# Patient Record
Sex: MALE | Race: WHITE
[De-identification: names, ages, dates, MRNs, and addresses within clinical notes are randomized per-mention and may not be internally consistent; named-entity substitution may affect disease eponyms.]

---

## 2021-12-28 ENCOUNTER — HOSPITAL ENCOUNTER (EMERGENCY)
Dept: HOSPITAL 61 - ER | Age: 41
End: 2021-12-28
Payer: SELF-PAY

## 2021-12-28 DIAGNOSIS — J18.9: ICD-10-CM

## 2021-12-28 DIAGNOSIS — E11.22: ICD-10-CM

## 2021-12-28 DIAGNOSIS — I46.9: Primary | ICD-10-CM

## 2021-12-28 DIAGNOSIS — N18.9: ICD-10-CM

## 2021-12-28 DIAGNOSIS — E11.10: ICD-10-CM

## 2021-12-28 DIAGNOSIS — K75.9: ICD-10-CM

## 2021-12-28 DIAGNOSIS — A41.9: ICD-10-CM

## 2021-12-28 LAB
% ATYL: 1 % (ref 0–0)
% BANDS: 29 % (ref 0–9)
% LYMPHS: 37 % (ref 24–48)
% METAS: 5 % (ref 0–0)
% MONOS: 11 % (ref 0–10)
% MYELOS: 3 % (ref 0–0)
% SEGS: 14 % (ref 35–66)
ACETAMIN: < 2 MCG/ML (ref 10–30)
ALBUMIN SERPL-MCNC: 2.2 G/DL (ref 3.4–5)
ALP SERPL-CCNC: 156 U/L (ref 46–116)
ALT SERPL-CCNC: 523 U/L (ref 16–63)
ANION GAP SERPL CALC-SCNC: 37 MMOL/L (ref 6–14)
APTT BLD: 78 SEC (ref 24–38)
AST SERPL-CCNC: 1113 U/L (ref 15–37)
BASOPHILS # BLD AUTO: 0.1 X10^3/UL (ref 0–0.2)
BASOPHILS NFR BLD: 1 % (ref 0–3)
BILIRUB DIRECT SERPL-MCNC: 0.4 MG/DL (ref 0–0.2)
BILIRUB SERPL-MCNC: 0.6 MG/DL (ref 0.2–1)
BUN SERPL-MCNC: 35 MG/DL (ref 8–26)
CALCIUM SERPL-MCNC: 8.6 MG/DL (ref 8.5–10.1)
CHLORIDE SERPL-SCNC: 90 MMOL/L (ref 98–107)
CK SERPL-CCNC: 694 U/L (ref 39–308)
CO2 SERPL-SCNC: 10 MMOL/L (ref 21–32)
CREAT SERPL-MCNC: 2.5 MG/DL (ref 0.7–1.3)
EOSINOPHIL NFR BLD: 0 % (ref 0–3)
EOSINOPHIL NFR BLD: 0 X10^3/UL (ref 0–0.7)
ERYTHROCYTE [DISTWIDTH] IN BLOOD BY AUTOMATED COUNT: 14.3 % (ref 11.5–14.5)
GFR SERPLBLD BASED ON 1.73 SQ M-ARVRAT: 28.6 ML/MIN
GLUCOSE SERPL-MCNC: 740 MG/DL (ref 70–99)
HCT VFR BLD CALC: 49.8 % (ref 39–53)
HGB BLD-MCNC: 15.1 G/DL (ref 13–17.5)
LYMPHOCYTES # BLD: 3 X10^3/UL (ref 1–4.8)
LYMPHOCYTES NFR BLD AUTO: 33 % (ref 24–48)
MAGNESIUM SERPL-MCNC: 4.4 MG/DL (ref 1.8–2.4)
MCH RBC QN AUTO: 29 PG (ref 25–35)
MCHC RBC AUTO-ENTMCNC: 30 G/DL (ref 31–37)
MCV RBC AUTO: 94 FL (ref 79–100)
MONO #: 0.9 X10^3/UL (ref 0–1.1)
MONOCYTES NFR BLD: 9 % (ref 0–9)
NEUT #: 5.2 X10^3/UL (ref 1.8–7.7)
NEUTROPHILS NFR BLD AUTO: 57 % (ref 31–73)
PHOSPHATE SERPL-MCNC: 14.6 MG/DL (ref 2.6–4.7)
PLATELET # BLD AUTO: 156 X10^3/UL (ref 140–400)
PLATELET # BLD EST: ADEQUATE 10*3/UL
POTASSIUM SERPL-SCNC: 5.6 MMOL/L (ref 3.5–5.1)
PROT SERPL-MCNC: 6.4 G/DL (ref 6.4–8.2)
PROTHROMBIN TIME: 18.8 SEC (ref 11.7–14)
RBC # BLD AUTO: 5.3 X10^6/UL (ref 4.3–5.7)
SALIC: 4.5 MG/DL (ref 2.8–20)
SODIUM SERPL-SCNC: 137 MMOL/L (ref 136–145)
TOXIC GRANULES BLD QL SMEAR: PRESENT
WBC # BLD AUTO: 9.2 X10^3/UL (ref 4–11)

## 2021-12-28 PROCEDURE — 85730 THROMBOPLASTIN TIME PARTIAL: CPT

## 2021-12-28 PROCEDURE — 85025 COMPLETE CBC W/AUTO DIFF WBC: CPT

## 2021-12-28 PROCEDURE — 83735 ASSAY OF MAGNESIUM: CPT

## 2021-12-28 PROCEDURE — 94002 VENT MGMT INPAT INIT DAY: CPT

## 2021-12-28 PROCEDURE — 80076 HEPATIC FUNCTION PANEL: CPT

## 2021-12-28 PROCEDURE — 85007 BL SMEAR W/DIFF WBC COUNT: CPT

## 2021-12-28 PROCEDURE — 82010 KETONE BODYS QUAN: CPT

## 2021-12-28 PROCEDURE — 83605 ASSAY OF LACTIC ACID: CPT

## 2021-12-28 PROCEDURE — 82550 ASSAY OF CK (CPK): CPT

## 2021-12-28 PROCEDURE — 82140 ASSAY OF AMMONIA: CPT

## 2021-12-28 PROCEDURE — 71045 X-RAY EXAM CHEST 1 VIEW: CPT

## 2021-12-28 PROCEDURE — G0480 DRUG TEST DEF 1-7 CLASSES: HCPCS

## 2021-12-28 PROCEDURE — 83880 ASSAY OF NATRIURETIC PEPTIDE: CPT

## 2021-12-28 PROCEDURE — 85379 FIBRIN DEGRADATION QUANT: CPT

## 2021-12-28 PROCEDURE — 83690 ASSAY OF LIPASE: CPT

## 2021-12-28 PROCEDURE — 99291 CRITICAL CARE FIRST HOUR: CPT

## 2021-12-28 PROCEDURE — 80048 BASIC METABOLIC PNL TOTAL CA: CPT

## 2021-12-28 PROCEDURE — 80329 ANALGESICS NON-OPIOID 1 OR 2: CPT

## 2021-12-28 PROCEDURE — 85610 PROTHROMBIN TIME: CPT

## 2021-12-28 PROCEDURE — 84484 ASSAY OF TROPONIN QUANT: CPT

## 2021-12-28 PROCEDURE — 93005 ELECTROCARDIOGRAM TRACING: CPT

## 2021-12-28 PROCEDURE — 51702 INSERT TEMP BLADDER CATH: CPT

## 2021-12-28 PROCEDURE — 36415 COLL VENOUS BLD VENIPUNCTURE: CPT

## 2021-12-28 PROCEDURE — 84100 ASSAY OF PHOSPHORUS: CPT

## 2021-12-28 PROCEDURE — 92950 HEART/LUNG RESUSCITATION CPR: CPT

## 2021-12-28 NOTE — RAD
INDICATION: Reason: arrest / Spl. Instructions:  / History: 



COMPARISON: None.



FINDINGS:



Single view of chest obtained.

Endotracheal tube midthoracic trachea.

Enteric tube is seen coursing towards the diaphragm with a portion obscured secondary to numerous ove
rlying lines.

Bilateral pulmonic opacities.





IMPRESSION:



*  Bilateral pulmonic opacities which could be secondary to bilateral infiltrate or edema.



*  Lines and tubes as above.



Electronically signed by: Glen Kaba MD (12/28/2021 2:49 PM) [id number]

## 2021-12-28 NOTE — EKG
[facility]

              [address] [place] [address]

Test Date:    2021               Test Time:    14:34:19

Pat Name:     CESAR SEWELLN              Department:   

Patient ID:   [id number]           Room:          

Gender:       M                        Technician:   

:          1980               Requested By: LYNNE RIVAS

Order Number: [id number]           Reading MD:   Germán Marks

                                 Measurements

Intervals                              Axis          

Rate:         92                       P:            90

ID:           [id number]                      QRS:          116

QRSD:         116                      T:            131

QT:           372                                    

QTc:          465                                    

                           Interpretive Statements

SINUS RHYTHM

ABNORMAL RIGHT AXIS DEVIATION

LOW LIMB LEAD VOLTAGE

QRS(T) CONTOUR ABNORMALITY

CONSISTENT WITH HIGH LATERAL INFARCT

PROBABLY OLD

ABNORMAL ECG

RI6.02

No previous ECG available for comparison

Electronically Signed On 2021 7:56:24 CST by Germán Marks

## 2021-12-28 NOTE — PHYS DOC
General Adult


HPI:


HPI:


41-year-old male past medical history of non-insulin-dependent diabetes presents

to the ED brought by EMS in PEA cardiac arrest, found down and unresponsive by 

girlfriend, was last conscious approximately 20 minutes prior to him being found

down.  Patient lives with his girlfriend and 2 young children.  Patient had been

vomiting for the past 2 days, complained of a fever (was in bed/not wearing 

clothes with the window open) and had refused to come to the hospital earlier 

today.  Pt started complaining of shortness of breath today.  Reports symptoms 

of fever in all family members, girlfriend had a cough.  Patient and family are 

not vaccinated for Covid-gf suspected they all had covid.





Review of Systems:


Review of Systems:


ROS: unable due to medical condition





Heart Score:


C/O Chest Pain:  N/A


Risk Factors:


Risk Factors:  DM, Current or recent (<one month) smoker, HTN, HLP, family 

history of CAD, obesity.


Risk Scores:


Score 0 - 3:  2.5% MACE over next 6 weeks - Discharge Home


Score 4 - 6:  20.3% MACE over next 6 weeks - Admit for Clinical Observation


Score 7 - 10:  72.7% MACE over next 6 weeks - Early Invasive Strategies





Current Medications:





Current Medications








 Medications


  (Trade)  Dose


 Ordered  Sig/Richard  Start Time


 Stop Time Status Last Admin


Dose Admin


 


 Norepinephrine


 Bitartrate 8 mg/


 Dextrose  258 ml @ 


 19.35 mls/


 hr  1X  ONCE  21 14:45


 21 04:04   





 


 Piperacillin Sod/


 Tazobactam Sod


 4.5 gm/Dextrose  100 ml @ 


 200 mls/hr  1X  ONCE  21 15:00


 21 15:29   





 


 Sodium Chloride  1,000 ml @ 


 1,000 mls/hr  1X  ONCE  21 15:00


 21 15:59   





 


 Vancomycin HCl


  (Vanco Per


 Pharmacy)  1 each  PRN DAILY  PRN  21 14:45


     














Allergies:


Allergies:





Allergies








Coded Allergies Type Severity Reaction Last Updated Verified


 


  Unable to Assess    21 No











Physical Exam:


PE:





Constitutional: pea arrest, centrally warm, 500cc blood in OG suction canister 


HENT: Normocephalic, atraumatic,


Eyes: Unresponsive pupils, no discharge., no sceral icterus


Neck: No deformity or rigidity


Cardiovascular: pea, narrow rhythm


Lungs & Thorax: Bilateral breath sounds-decreased on right, I confirmed ett 

placement with mac 3 glidescope (8-0 ett, 26 at lip placed by ems)


Abdomen:  soft, no tenderness,  


Skin: Warm, dry, 


Extremities:  no cyanosis, no lower extremity edema


Neurologic: GCS3, unresponsive





Current Patient Data:


Labs:





                                Laboratory Tests








Test


 21


13:20


 


White Blood Count


 9.2 x10^3/uL


(4.0-11.0)


 


Red Blood Count


 5.30 x10^6/uL


(4.30-5.70)


 


Hemoglobin


 15.1 g/dL


(13.0-17.5)


 


Hematocrit


 49.8 %


(39.0-53.0)


 


Mean Corpuscular Volume


 94 fL ()





 


Mean Corpuscular Hemoglobin 29 pg (25-35)  


 


Mean Corpuscular Hemoglobin


Concent 30 g/dL


(31-37)  L


 


Red Cell Distribution Width


 14.3 %


(11.5-14.5)


 


Platelet Count


 156 x10^3/uL


(140-400)


 


Neutrophils (%) (Auto) 57 % (31-73)  


 


Lymphocytes (%) (Auto) 33 % (24-48)  


 


Monocytes (%) (Auto) 9 % (0-9)  


 


Eosinophils (%) (Auto) 0 % (0-3)  


 


Basophils (%) (Auto) 1 % (0-3)  


 


Neutrophils # (Auto)


 5.2 x10^3/uL


(1.8-7.7)


 


Lymphocytes # (Auto)


 3.0 x10^3/uL


(1.0-4.8)


 


Monocytes # (Auto)


 0.9 x10^3/uL


(0.0-1.1)


 


Eosinophils # (Auto)


 0.0 x10^3/uL


(0.0-0.7)


 


Basophils # (Auto)


 0.1 x10^3/uL


(0.0-0.2)


 


Prothrombin Time


 18.8 SEC


(11.7-14.0)  H


 


Prothrombin Time INR


 1.6 (0.8-1.1)


H


 


Activated Partial


Thromboplast Time 78 SEC (24-38)


H





                                Laboratory Tests


21 13:20








Vital Signs:





                                   Vital Signs








  Date Time  Temp Pulse Resp B/P (MAP) Pulse Ox O2 Delivery O2 Flow Rate FiO2


 


21 14:35     71 Ventilator  











EKG:


EKG:


sinus rhythm 92 bpm, rad, TWI 1 & aVL, no IHSAN/STD,





Radiology/Procedures:


Radiology/Procedures:


                                 IMAGING REPORT





                                     Signed





PATIENT: CESAR LANE    ACCOUNT: [id number]     MRN#: [id number]


: 1980           LOCATION: ER              AGE: 41


SEX: M                    EXAM DT: 21         ACCESSION#: [id number]


STATUS: REG ER            ORD. PHYSICIAN: ELOINA RIVAS DO


REASON: arrest


PROCEDURE: CHEST AP ONLY








INDICATION: Reason: arrest / Spl. Instructions:  / History: 





COMPARISON: None.





FINDINGS:





Single view of chest obtained.


Endotracheal tube midthoracic trachea.


Enteric tube is seen coursing towards the diaphragm with a portion obscured 

secondary to numerous overlying lines.


Bilateral pulmonic opacities.








IMPRESSION:





*  Bilateral pulmonic opacities which could be secondary to bilateral infiltrate

or edema.





*  Lines and tubes as above.





Electronically signed by: Bonnie Reynolds MD (2021 2:49 PM) [id number]














DICTATED and SIGNED BY:     BONNIE REYNOLDS MD


DATE:     21 [id number] 0





Course & Med Decision Making:


Course & Med Decision Making


Pertinent Labs and Imaging studies reviewed. (See chart for details)





Concern for PEA cardiac arrest, ATLS protocol continued in ed.  See nursing 

documentation for resuscitation efforts.  Patient was coded multiple times in 

the emergency department. Unable to obtain rosc in ed after 1hour and 45 minutes

of resuscitative efforts (from time of [id number]).  Labs concerning for DKA, 

sepsis, multifocal pneumonia, hepatitis and renal failure. Ddx also includes 

upper GI bleeding given 500 cc of blood suctioned from patient's OG tube during 

resuscitative efforts.  GF reports patient does not drink alcohol daily.  Next 

of kin( patient's sister) and medical examiner was called by RN. Girlfriend at 

bedside. Time of death 1527.





Critical Care: Authorized and Performed by: Eloina Rivas DO


Total critical care time: approximately 90 minutes





Due to a high probability of clinically significant, life threatening 

deterioration, the patient required my highest level of preparedness to 

intervene emergently and I personally spent this critical care time directly and

personally managing the patient. This critical care time included obtaining a h

istory; examining the patient; pulse oximetry; ventilator management if 

necessary; ordering and review of studies; arranging urgent treatment with 

development of a management plan; evaluation of patient's response to treatment;

frequent reassessment; discussion with patient/family; and, discussions with 

other providers. This critical care time was performed to assess and manage the 

high probability of imminent, life-threatening deterioration that could result 

in multi-organ failure. It was exclusive of separately billable procedures and 

treating other patients and teaching time.


Please see MDM section and the rest of the note for further information on 

patient assessment and treatment.





Dragon Disclaimer:


Dragon Disclaimer:


This electronic medical record was generated, in whole or in part, using a voice

recognition dictation system.





Departure


Departure


Impression:  


   Primary Impression:  


   Cardiac arrest


   Additional Impressions:  


   DKA (diabetic ketoacidosis)


   Sepsis


   Multifocal pneumonia


   Hepatitis


   Renal failure


Disposition:  20 


Condition:  


Referrals:  


NO PCP (PCP)











ELOINA RIVAS DO               Dec 28, 2021 14:59